# Patient Record
Sex: FEMALE
[De-identification: names, ages, dates, MRNs, and addresses within clinical notes are randomized per-mention and may not be internally consistent; named-entity substitution may affect disease eponyms.]

---

## 2018-03-22 ENCOUNTER — HOSPITAL ENCOUNTER (OUTPATIENT)
Dept: HOSPITAL 93 - PPHC | Age: 28
Discharge: HOME | End: 2018-03-22
Attending: GENERAL PRACTICE
Payer: COMMERCIAL

## 2018-03-22 ENCOUNTER — HOSPITAL ENCOUNTER (OUTPATIENT)
Dept: HOSPITAL 93 - LAB | Age: 28
Discharge: HOME | End: 2018-03-22
Attending: GENERAL PRACTICE
Payer: COMMERCIAL

## 2018-03-22 DIAGNOSIS — N39.0: Primary | ICD-10-CM

## 2024-04-01 ENCOUNTER — NON-APPOINTMENT (OUTPATIENT)
Age: 34
End: 2024-04-01

## 2024-04-03 ENCOUNTER — APPOINTMENT (OUTPATIENT)
Dept: OTOLARYNGOLOGY | Facility: CLINIC | Age: 34
End: 2024-04-03
Payer: COMMERCIAL

## 2024-04-03 DIAGNOSIS — Z78.9 OTHER SPECIFIED HEALTH STATUS: ICD-10-CM

## 2024-04-03 DIAGNOSIS — H61.21 IMPACTED CERUMEN, RIGHT EAR: ICD-10-CM

## 2024-04-03 DIAGNOSIS — Z86.39 PERSONAL HISTORY OF OTHER ENDOCRINE, NUTRITIONAL AND METABOLIC DISEASE: ICD-10-CM

## 2024-04-03 DIAGNOSIS — H60.311 DIFFUSE OTITIS EXTERNA, RIGHT EAR: ICD-10-CM

## 2024-04-03 DIAGNOSIS — Z87.09 PERSONAL HISTORY OF OTHER DISEASES OF THE RESPIRATORY SYSTEM: ICD-10-CM

## 2024-04-03 DIAGNOSIS — Z87.01 PERSONAL HISTORY OF PNEUMONIA (RECURRENT): ICD-10-CM

## 2024-04-03 PROCEDURE — 99203 OFFICE O/P NEW LOW 30 MIN: CPT | Mod: 25

## 2024-04-03 RX ORDER — NORETHINDRONE ACETATE AND ETHINYL ESTRADIOL 1.5; 3 MG/1; UG/1
1.5-3 TABLET ORAL
Refills: 0 | Status: ACTIVE | COMMUNITY

## 2024-04-03 RX ORDER — TRAZODONE HYDROCHLORIDE 100 MG/1
100 TABLET ORAL
Refills: 0 | Status: ACTIVE | COMMUNITY

## 2024-04-03 RX ORDER — ZOLPIDEM TARTRATE 10 MG/1
10 TABLET ORAL
Refills: 0 | Status: ACTIVE | COMMUNITY

## 2024-04-03 NOTE — HISTORY OF PRESENT ILLNESS
[de-identified] : KOKO BLANCO has a history of right ear pain for the past 1 week noted while working out and sweating using an ear bud.  No prior ear disease. No otorrhea.  No change in hearing reported. Treated in urgent care with acetic acid hydrocortisone otic drops.  Symptoms have improved.No prior history of infectious ear disease.

## 2024-04-03 NOTE — CONSULT LETTER
[FreeTextEntry2] : Dear GABI DAN  [Please see my note below.] : Please see my note below. [FreeTextEntry1] : Thank you for allowing me to participate in the care of KOKO BLANCO . Please see the attached visit note.    Piyush Chan Otology Medical Director of [facility] Department of Otolaryngology [facility]

## 2024-04-03 NOTE — PHYSICAL EXAM
[FreeTextEntry1] : Microscopic ear exam with cerumen debridement:  Right ear: Obstructing cerumen was debrided from the ear canal using suction, and curet.  Ear canal moist with residual otic drops.  No inflammation.  The tympanic membrane was intact and noninflamed.  Left ear: The ear canal was patent and nonobstructed.  The tympanic membrane was intact and noninflamed. [Normal] : mucosa is normal [Midline] : trachea located in midline position

## 2024-04-03 NOTE — ASSESSMENT
[FreeTextEntry1] : Symptoms and findings are consistent with resolving otitis externa.  Management options discussed.  I have recommended discontinuing the otic drops.  I have recommended follow-up in 1 week if symptoms do not fully resolve.  Earlier follow-up if symptoms progress.  Ear hygiene reviewed.